# Patient Record
Sex: MALE | Race: WHITE | Employment: FULL TIME | URBAN - NONMETROPOLITAN AREA
[De-identification: names, ages, dates, MRNs, and addresses within clinical notes are randomized per-mention and may not be internally consistent; named-entity substitution may affect disease eponyms.]

---

## 2017-02-20 ENCOUNTER — TELEPHONE (OUTPATIENT)
Dept: FAMILY MEDICINE CLINIC | Age: 57
End: 2017-02-20

## 2017-02-22 RX ORDER — PANTOPRAZOLE SODIUM 40 MG/1
40 TABLET, DELAYED RELEASE ORAL DAILY
Qty: 30 TABLET | Refills: 3 | Status: SHIPPED | OUTPATIENT
Start: 2017-02-22 | End: 2017-03-30 | Stop reason: SDUPTHER

## 2017-03-30 ENCOUNTER — OFFICE VISIT (OUTPATIENT)
Dept: FAMILY MEDICINE CLINIC | Age: 57
End: 2017-03-30
Payer: COMMERCIAL

## 2017-03-30 VITALS
SYSTOLIC BLOOD PRESSURE: 120 MMHG | DIASTOLIC BLOOD PRESSURE: 68 MMHG | BODY MASS INDEX: 25.98 KG/M2 | HEART RATE: 79 BPM | RESPIRATION RATE: 18 BRPM | WEIGHT: 208.9 LBS | HEIGHT: 75 IN | OXYGEN SATURATION: 98 %

## 2017-03-30 DIAGNOSIS — J10.1 INFLUENZA B: Primary | ICD-10-CM

## 2017-03-30 DIAGNOSIS — R09.81 HEAD CONGESTION: ICD-10-CM

## 2017-03-30 DIAGNOSIS — R09.89 CHEST CONGESTION: ICD-10-CM

## 2017-03-30 DIAGNOSIS — R05.9 COUGH: ICD-10-CM

## 2017-03-30 LAB
INFLUENZA A ANTIBODY: NEGATIVE
INFLUENZA B ANTIBODY: POSITIVE

## 2017-03-30 PROCEDURE — 87804 INFLUENZA ASSAY W/OPTIC: CPT | Performed by: NURSE PRACTITIONER

## 2017-03-30 PROCEDURE — 99213 OFFICE O/P EST LOW 20 MIN: CPT | Performed by: NURSE PRACTITIONER

## 2017-03-30 RX ORDER — PANTOPRAZOLE SODIUM 40 MG/1
40 TABLET, DELAYED RELEASE ORAL DAILY
Qty: 30 TABLET | Refills: 3 | Status: SHIPPED | OUTPATIENT
Start: 2017-03-30 | End: 2019-09-24 | Stop reason: ALTCHOICE

## 2017-03-30 RX ORDER — OSELTAMIVIR PHOSPHATE 75 MG/1
75 CAPSULE ORAL 2 TIMES DAILY
Qty: 10 CAPSULE | Refills: 0 | Status: SHIPPED | OUTPATIENT
Start: 2017-03-30 | End: 2017-04-04

## 2017-03-30 RX ORDER — PANTOPRAZOLE SODIUM 40 MG/1
40 TABLET, DELAYED RELEASE ORAL DAILY
Qty: 30 TABLET | Refills: 3 | Status: CANCELLED | OUTPATIENT
Start: 2017-03-30

## 2018-01-09 ENCOUNTER — OFFICE VISIT (OUTPATIENT)
Dept: FAMILY MEDICINE CLINIC | Age: 58
End: 2018-01-09
Payer: COMMERCIAL

## 2018-01-09 VITALS
WEIGHT: 209 LBS | DIASTOLIC BLOOD PRESSURE: 68 MMHG | BODY MASS INDEX: 25.45 KG/M2 | HEART RATE: 70 BPM | HEIGHT: 76 IN | SYSTOLIC BLOOD PRESSURE: 130 MMHG | OXYGEN SATURATION: 98 % | RESPIRATION RATE: 18 BRPM

## 2018-01-09 DIAGNOSIS — M10.071 ACUTE IDIOPATHIC GOUT OF RIGHT ANKLE: Primary | ICD-10-CM

## 2018-01-09 PROCEDURE — 99213 OFFICE O/P EST LOW 20 MIN: CPT | Performed by: NURSE PRACTITIONER

## 2018-01-09 RX ORDER — COLCHICINE 0.6 MG/1
0.6 TABLET ORAL 2 TIMES DAILY
Qty: 60 TABLET | Refills: 0 | Status: SHIPPED | OUTPATIENT
Start: 2018-01-09 | End: 2019-09-24 | Stop reason: ALTCHOICE

## 2018-01-09 RX ORDER — ALLOPURINOL 100 MG/1
100 TABLET ORAL DAILY
Qty: 30 TABLET | Refills: 5 | Status: SHIPPED | OUTPATIENT
Start: 2018-01-09 | End: 2018-01-09 | Stop reason: SDUPTHER

## 2018-01-09 RX ORDER — ALLOPURINOL 100 MG/1
100 TABLET ORAL DAILY
Qty: 30 TABLET | Refills: 5 | Status: SHIPPED | OUTPATIENT
Start: 2018-01-09 | End: 2019-09-24 | Stop reason: ALTCHOICE

## 2018-01-09 RX ORDER — COLCHICINE 0.6 MG/1
0.6 TABLET ORAL 2 TIMES DAILY
Qty: 60 TABLET | Refills: 0 | Status: SHIPPED | OUTPATIENT
Start: 2018-01-09 | End: 2018-01-09 | Stop reason: SDUPTHER

## 2018-01-09 NOTE — PATIENT INSTRUCTIONS
they seem to have the same symptoms. What may be good for you may be harmful to others. · If you are no longer taking a prescribed medication and you have pills left please take your pills out of their original containers. Mix crushed pills with an undesirable substance, such as cat litter or used coffee grounds. Put the mixture into a disposable container with a lid, such as an empty margarine tub, or into a sealable bag. Cover up or remove any of your personal information on the empty containers by covering it with black permanent marker or duct tape. Place the sealed container with the mixture, and the empty drug containers, in the trash. · If you use a medication that is in the form of a patch, dispose of used patches by folding them in half so that the sticky sides meet, and then flushing them down a toilet. They should not be placed in the household trash where children or pets can find them. · If you have any questions, ask your provider or pharmacist for more information. · Be sure to keep all appointments for provider visits or tests.   .smokeless

## 2018-01-11 ENCOUNTER — TELEPHONE (OUTPATIENT)
Dept: FAMILY MEDICINE CLINIC | Age: 58
End: 2018-01-11

## 2018-01-12 RX ORDER — PROBENECID AND COLCHICINE 500; .5 MG/1; MG/1
1 TABLET ORAL 2 TIMES DAILY
Qty: 30 TABLET | Refills: 0 | Status: SHIPPED | OUTPATIENT
Start: 2018-01-12 | End: 2019-09-24 | Stop reason: ALTCHOICE

## 2018-01-15 ENCOUNTER — TELEPHONE (OUTPATIENT)
Dept: FAMILY MEDICINE CLINIC | Age: 58
End: 2018-01-15

## 2018-01-20 ASSESSMENT — ENCOUNTER SYMPTOMS: RESPIRATORY NEGATIVE: 1

## 2018-01-20 NOTE — PROGRESS NOTES
SUBJECTIVE:  Darlyn Parr is a 59 y.o. male that presents with   Chief Complaint   Patient presents with    Ankle Pain     right, started yesterday   . HPI:    Patient presents with complaints of 3 day history of right ankle pain with swelling and patient says he has had to work on it and nothing is helping. He thought he could maybe get a shot and be able to keep working. No known injury but known history of gout. Review of Systems   Constitutional: Positive for activity change. HENT: Negative. Respiratory: Negative. Cardiovascular: Negative. Genitourinary: Negative. Musculoskeletal: Positive for arthralgias and gait problem. All other systems reviewed and are negative. OBJECTIVE:  /68 (Site: Left Arm, Position: Sitting, Cuff Size: Large Adult)   Pulse 70   Resp 18   Ht 6' 4\" (1.93 m)   Wt 209 lb (94.8 kg)   SpO2 98% Comment: room air  BMI 25.44 kg/m²    Physical Exam   Constitutional: He appears well-developed and well-nourished. HENT:   Head: Normocephalic and atraumatic. Eyes: Conjunctivae are normal. Pupils are equal, round, and reactive to light. Cardiovascular: Normal rate, regular rhythm and normal heart sounds. Pulmonary/Chest: Effort normal and breath sounds normal.   Musculoskeletal:        Right ankle: He exhibits decreased range of motion. Tenderness. Lateral malleolus tenderness found. Nursing note and vitals reviewed. No results found for requested labs within last 30 days. No results found for: LABA1C, LABMICR, LDLCALC      No results found for: WBC, NEUTROABS, HGB, HCT, MCV, PLT    No results found for: TSH      ASSESSMENT/PLAN:  1. Acute idiopathic gout of right ankle          No orders of the defined types were placed in this encounter.        Outpatient Encounter Prescriptions as of 1/9/2018   Medication Sig Dispense Refill    colchicine (COLCRYS) 0.6 MG tablet Take 1 tablet by mouth 2 times daily 60 tablet 0    allopurinol (ZYLOPRIM) 100 MG tablet Take 1 tablet by mouth daily 30 tablet 5    [DISCONTINUED] colchicine (COLCRYS) 0.6 MG tablet Take 1 tablet by mouth 2 times daily 60 tablet 0    [DISCONTINUED] allopurinol (ZYLOPRIM) 100 MG tablet Take 1 tablet by mouth daily 30 tablet 5    pantoprazole (PROTONIX) 40 MG tablet Take 1 tablet by mouth daily 30 tablet 3     No facility-administered encounter medications on file as of 1/9/2018. Return if symptoms worsen or fail to improve. PATIENT COUNSELING    Counseling was provided today regarding the following topics: Healthy eating habits, Regular exercise, substance abuse and healthy sleep habits. Discussed use, benefit, and side effects of prescribed medications. Barriers to medication compliance addressed. All patient questions answered. Pt voiced understanding.

## 2018-10-08 ENCOUNTER — HOSPITAL ENCOUNTER (OUTPATIENT)
Facility: HOSPITAL | Age: 58
Discharge: HOME | End: 2018-10-08
Payer: COMMERCIAL

## 2018-10-08 ENCOUNTER — OFFICE VISIT (OUTPATIENT)
Dept: FAMILY MEDICINE CLINIC | Age: 58
End: 2018-10-08
Payer: COMMERCIAL

## 2018-10-08 VITALS
SYSTOLIC BLOOD PRESSURE: 117 MMHG | DIASTOLIC BLOOD PRESSURE: 62 MMHG | BODY MASS INDEX: 23.87 KG/M2 | RESPIRATION RATE: 20 BRPM | OXYGEN SATURATION: 99 % | HEIGHT: 76 IN | HEART RATE: 68 BPM | WEIGHT: 196 LBS

## 2018-10-08 DIAGNOSIS — Z20.09 EXPOSURE TO INTESTINAL INFECTIOUS DISEASE: ICD-10-CM

## 2018-10-08 DIAGNOSIS — R19.7 DIARRHEA, UNSPECIFIED TYPE: Primary | ICD-10-CM

## 2018-10-08 DIAGNOSIS — R51.9 NONINTRACTABLE HEADACHE, UNSPECIFIED CHRONICITY PATTERN, UNSPECIFIED HEADACHE TYPE: ICD-10-CM

## 2018-10-08 DIAGNOSIS — R19.7 DIARRHEA, UNSPECIFIED TYPE: ICD-10-CM

## 2018-10-08 PROCEDURE — 80053 COMPREHEN METABOLIC PANEL: CPT

## 2018-10-08 PROCEDURE — 99213 OFFICE O/P EST LOW 20 MIN: CPT | Performed by: FAMILY MEDICINE

## 2018-10-08 PROCEDURE — 85027 COMPLETE CBC AUTOMATED: CPT

## 2018-10-08 PROCEDURE — 36415 COLL VENOUS BLD VENIPUNCTURE: CPT

## 2018-10-08 PROCEDURE — 86708 HEPATITIS A ANTIBODY: CPT

## 2018-10-08 RX ORDER — AZITHROMYCIN 250 MG/1
TABLET, FILM COATED ORAL
Qty: 6 TABLET | Refills: 0 | Status: SHIPPED | OUTPATIENT
Start: 2018-10-08 | End: 2019-09-24 | Stop reason: ALTCHOICE

## 2018-10-08 ASSESSMENT — ENCOUNTER SYMPTOMS: DIARRHEA: 1

## 2018-10-08 ASSESSMENT — PATIENT HEALTH QUESTIONNAIRE - PHQ9
1. LITTLE INTEREST OR PLEASURE IN DOING THINGS: 0
SUM OF ALL RESPONSES TO PHQ9 QUESTIONS 1 & 2: 0
2. FEELING DOWN, DEPRESSED OR HOPELESS: 0
SUM OF ALL RESPONSES TO PHQ QUESTIONS 1-9: 0
SUM OF ALL RESPONSES TO PHQ QUESTIONS 1-9: 0

## 2018-10-09 LAB
A/G RATIO: 1.8 (ref 0.8–2)
ALBUMIN SERPL-MCNC: 4.6 G/DL (ref 3.4–4.8)
ALP BLD-CCNC: 87 U/L (ref 25–100)
ALT SERPL-CCNC: 14 U/L (ref 4–36)
ANION GAP SERPL CALCULATED.3IONS-SCNC: 9 MMOL/L (ref 3–16)
AST SERPL-CCNC: 15 U/L (ref 8–33)
BILIRUB SERPL-MCNC: 1.5 MG/DL (ref 0.3–1.2)
BUN BLDV-MCNC: 8 MG/DL (ref 6–20)
CALCIUM SERPL-MCNC: 9.4 MG/DL (ref 8.5–10.5)
CHLORIDE BLD-SCNC: 105 MMOL/L (ref 98–107)
CO2: 29 MMOL/L (ref 20–30)
CREAT SERPL-MCNC: 1.1 MG/DL (ref 0.4–1.2)
GFR AFRICAN AMERICAN: >59
GFR NON-AFRICAN AMERICAN: >59
GLOBULIN: 2.6 G/DL
GLUCOSE BLD-MCNC: 89 MG/DL (ref 74–106)
HCT VFR BLD CALC: 44.9 % (ref 40–54)
HEMOGLOBIN: 15.6 G/DL (ref 13–18)
MCH RBC QN AUTO: 31.6 PG (ref 27–32)
MCHC RBC AUTO-ENTMCNC: 34.7 G/DL (ref 31–35)
MCV RBC AUTO: 91.1 FL (ref 80–100)
PDW BLD-RTO: 12.6 % (ref 11–16)
PLATELET # BLD: 227 K/UL (ref 150–400)
PMV BLD AUTO: 10.1 FL (ref 6–10)
POTASSIUM SERPL-SCNC: 4.1 MMOL/L (ref 3.4–5.1)
RBC # BLD: 4.93 M/UL (ref 4.5–6)
SODIUM BLD-SCNC: 143 MMOL/L (ref 136–145)
TOTAL PROTEIN: 7.2 G/DL (ref 6.4–8.3)
WBC # BLD: 6.4 K/UL (ref 4–11)

## 2018-10-11 LAB — HAV AB SERPL IA-ACNC: POSITIVE

## 2018-10-15 ENCOUNTER — TELEPHONE (OUTPATIENT)
Dept: FAMILY MEDICINE CLINIC | Age: 58
End: 2018-10-15

## 2018-10-15 NOTE — TELEPHONE ENCOUNTER
Patient called and asked to talk to Dr. Charmayne Sack. He stated that he was to call back in regards to his lab results. He is expecting someone to call him back today.  Maribell-     Pam 10/15/18 [facility]

## 2018-11-20 ENCOUNTER — TELEPHONE (OUTPATIENT)
Dept: FAMILY MEDICINE CLINIC | Age: 58
End: 2018-11-20

## 2019-09-24 ENCOUNTER — OFFICE VISIT (OUTPATIENT)
Dept: FAMILY MEDICINE CLINIC | Age: 59
End: 2019-09-24
Payer: COMMERCIAL

## 2019-09-24 VITALS
HEART RATE: 64 BPM | DIASTOLIC BLOOD PRESSURE: 64 MMHG | WEIGHT: 206 LBS | OXYGEN SATURATION: 98 % | HEIGHT: 76 IN | BODY MASS INDEX: 25.09 KG/M2 | SYSTOLIC BLOOD PRESSURE: 104 MMHG
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
## 2019-09-24 DIAGNOSIS — S61.259A DOG BITE OF FINGER, INITIAL ENCOUNTER: Primary | ICD-10-CM

## 2019-09-24 PROCEDURE — 99214 OFFICE O/P EST MOD 30 MIN: CPT | Performed by: NURSE PRACTITIONER

## 2019-09-24 RX ORDER — CEPHALEXIN 500 MG/1
500 CAPSULE ORAL 2 TIMES DAILY
Qty: 20 CAPSULE | Refills: 0 | Status: SHIPPED | OUTPATIENT
Start: 2019-09-24 | End: 2020-12-15 | Stop reason: SDUPTHER

## 2019-09-24 ASSESSMENT — PATIENT HEALTH QUESTIONNAIRE - PHQ9
1. LITTLE INTEREST OR PLEASURE IN DOING THINGS: 0
2. FEELING DOWN, DEPRESSED OR HOPELESS: 0
SUM OF ALL RESPONSES TO PHQ QUESTIONS 1-9: 0
SUM OF ALL RESPONSES TO PHQ9 QUESTIONS 1 & 2: 0
SUM OF ALL RESPONSES TO PHQ QUESTIONS 1-9: 0

## 2019-09-24 ASSESSMENT — ENCOUNTER SYMPTOMS
NAUSEA: 0
EYE PAIN: 0
SORE THROAT: 0
VOMITING: 0
RHINORRHEA: 0
ABDOMINAL PAIN: 0
DIARRHEA: 0
EYE ITCHING: 0
SHORTNESS OF BREATH: 0

## 2019-09-24 NOTE — PROGRESS NOTES
two-point discrimination, normal capillary refill, no deformity, no laceration and no swelling. Decreased sensation is not present in the ulnar distribution, is not present in the medial distribution and is not present in the radial distribution. He exhibits no finger abduction, no thumb/finger opposition and no wrist extension trouble. Hands:  Neurological: He is alert and oriented to person, place, and time. Skin: Skin is warm and dry. Psychiatric: He has a normal mood and affect. His speech is normal and behavior is normal. Judgment and thought content normal. Cognition and memory are normal.   Nursing note and vitals reviewed. Assessment/Plan    1. Dog bite of finger, initial encounter  Area of the finger was cleansed with betadine and DSD applied - advised patient to keep area clean and covered when working and take all his antibiotics. - cephALEXin (KEFLEX) 500 MG capsule; Take 1 capsule by mouth 2 times daily for 10 days  Dispense: 20 capsule; Refill: 0       Return in about 1 week (around 10/1/2019), or if symptoms worsen or fail to improve, for wound check .     Jeanne MANDUJANO  09/24/19   3:30 PM

## 2019-09-24 NOTE — PATIENT INSTRUCTIONS
· Keep a list of your medicines with you. List all of the prescription medicines, nonprescription medicines, supplements, natural remedies, and vitamins that you take. Tell your healthcare providers who treat you about all of the products you are taking. Your provider can provide you with a form to keep track of them. Just ask. · Follow the directions that come with your medicine, including information about food or alcohol. Make sure you know how and when to take your medicine. Do not take more or less than you are supposed to take. · Keep all medicines out of the reach of children. · Store medicines according to the directions on the label. · Monitor yourself. Learn to know how your body reacts to your new medicine and keep track of how it makes you feel before attempting (If your provider has allowed you to do so) to drive or go to work. · Seek emergency medical attention if you think you have used too much of this medicine. An overdose of any prescription medicine can be fatal. Overdose symptoms may include extreme drowsiness, muscle weakness, confusion, cold and clammy skin, pinpoint pupils, shallow breathing, slow heart rate, fainting, or coma. · Don't share prescription medicines with others, even when they seem to have the same symptoms. What may be good for you may be harmful to others. · If you are no longer taking a prescribed medication and you have pills left please take your pills out of their original containers. Mix crushed pills with an undesirable substance, such as cat litter or used coffee grounds. Put the mixture into a disposable container with a lid, such as an empty margarine tub, or into a sealable bag. Cover up or remove any of your personal information on the empty containers by covering it with black permanent marker or duct tape. Place the sealed container with the mixture, and the empty drug containers, in the trash.    · If you use a medication that is in the form of a patch, to heal, depending on how bad it is. Taking good care of your wound at home will help it heal and reduce your chance of infection. The doctor has checked you carefully, but problems can develop later. If you notice any problems or new symptoms, get medical treatment right away. Follow-up care is a key part of your treatment and safety. Be sure to make and go to all appointments, and call your doctor if you are having problems. It's also a good idea to know your test results and keep a list of the medicines you take. How can you care for yourself at home? If your doctor told you how to care for your wound, follow your doctor's instructions. If you did not get instructions, follow this general advice:  After 24 to 48 hours, gently wash the wound with clean water 2 times a day. Do not scrub or soak the wound. Don't use hydrogen peroxide or alcohol, which can slow healing. You may cover the wound with a thin layer of petroleum jelly, such as Vaseline, and a nonstick bandage. Apply more petroleum jelly and replace the bandage as needed. After you shower, gently dry the wound with a clean towel. If your doctor has closed the wound, cover the bandage with a plastic bag before you take a shower. A small amount of skin redness and swelling around the wound edges and the stitches or staples is normal. Your wound may itch or feel irritated. Do not scratch or rub the wound. Ask your doctor if you can take an over-the-counter pain medicine, such as acetaminophen (Tylenol), ibuprofen (Advil, Motrin), or naproxen (Aleve). Read and follow all instructions on the label. Do not take two or more pain medicines at the same time unless the doctor told you to. Many pain medicines have acetaminophen, which is Tylenol. Too much acetaminophen (Tylenol) can be harmful. If your bite puts you at risk for rabies, you will get a series of shots over the next few weeks to prevent rabies.  Your doctor will tell you when to get the

## 2019-10-01 ENCOUNTER — OFFICE VISIT (OUTPATIENT)
Dept: FAMILY MEDICINE CLINIC | Age: 59
End: 2019-10-01
Payer: COMMERCIAL

## 2019-10-01 VITALS
WEIGHT: 205 LBS | OXYGEN SATURATION: 98 % | DIASTOLIC BLOOD PRESSURE: 68 MMHG | SYSTOLIC BLOOD PRESSURE: 112 MMHG | HEIGHT: 76 IN | RESPIRATION RATE: 18 BRPM | HEART RATE: 72 BPM | BODY MASS INDEX: 24.96 KG/M2

## 2019-10-01 DIAGNOSIS — S61.258D: Primary | ICD-10-CM

## 2019-10-01 PROCEDURE — 99213 OFFICE O/P EST LOW 20 MIN: CPT | Performed by: NURSE PRACTITIONER

## 2019-10-06 ASSESSMENT — ENCOUNTER SYMPTOMS
BACK PAIN: 0
SORE THROAT: 0
EYE PAIN: 0
VOMITING: 0
EYE REDNESS: 0
CHEST TIGHTNESS: 0
ABDOMINAL PAIN: 0
TROUBLE SWALLOWING: 0
COUGH: 0
SHORTNESS OF BREATH: 0
DIARRHEA: 0
COLOR CHANGE: 0
NAUSEA: 0

## 2020-12-15 ENCOUNTER — VIRTUAL VISIT (OUTPATIENT)
Dept: FAMILY MEDICINE CLINIC | Age: 60
End: 2020-12-15
Payer: COMMERCIAL

## 2020-12-15 PROCEDURE — 99213 OFFICE O/P EST LOW 20 MIN: CPT | Performed by: NURSE PRACTITIONER

## 2020-12-15 RX ORDER — CEPHALEXIN 500 MG/1
500 CAPSULE ORAL 2 TIMES DAILY
Qty: 20 CAPSULE | Refills: 0 | Status: SHIPPED | OUTPATIENT
Start: 2020-12-15 | End: 2020-12-25

## 2020-12-15 RX ORDER — ALBUTEROL SULFATE 90 UG/1
2 AEROSOL, METERED RESPIRATORY (INHALATION) EVERY 6 HOURS PRN
Qty: 1 INHALER | Refills: 3 | Status: SHIPPED | OUTPATIENT
Start: 2020-12-15

## 2020-12-15 RX ORDER — PREDNISONE 10 MG/1
10 TABLET ORAL
Qty: 15 TABLET | Refills: 0 | Status: SHIPPED | OUTPATIENT
Start: 2020-12-15 | End: 2020-12-20

## 2020-12-15 ASSESSMENT — ENCOUNTER SYMPTOMS
COUGH: 1
GASTROINTESTINAL NEGATIVE: 1
EYES NEGATIVE: 1

## 2020-12-15 NOTE — PROGRESS NOTES
SUBJECTIVE:  Paulie Ibarra is a 56 y.o. male that presents with   Chief Complaint   Patient presents with    Cough    Congestion   . HPI:    This is a very pleasant 68-year-old white male who presents today via video with complaints of cough and congestion he says that it started Sunday night he went hunting on Saturday and on Sunday evening he started coughing and rattling in his chest.  He tells me that he did use his wife's inhaler and that that helped him to start coughing some of the stuff. Says that it is yellow. He has no fever or chills does have some nasal congestion. Tells me that when he does cough some of that up that it really hurts. He wondered if he might have pleurisy. Breathing is not painful. Review of Systems   Constitutional: Negative. HENT: Positive for congestion and postnasal drip. Eyes: Negative. Respiratory: Positive for cough. Cardiovascular: Negative. Gastrointestinal: Negative. Endocrine: Negative. Genitourinary: Negative. Musculoskeletal: Negative. Neurological: Negative. Psychiatric/Behavioral: Negative. OBJECTIVE:  There were no vitals taken for this visit. Physical Exam  Constitutional:       Appearance: Normal appearance. HENT:      Head: Normocephalic and atraumatic. Pulmonary:      Effort: Pulmonary effort is normal.   Neurological:      Mental Status: He is alert and oriented to person, place, and time. Psychiatric:         Mood and Affect: Mood normal.         Behavior: Behavior normal.         Thought Content: Thought content normal.         Judgment: Judgment normal.       No results found for requested labs within last 30 days.        No results found for: Angelica Brown, LDLCALC      Lab Results   Component Value Date    WBC 6.4 10/08/2018    HGB 15.6 10/08/2018    HCT 44.9 10/08/2018    MCV 91.1 10/08/2018     10/08/2018       No results found for: TSH      ASSESSMENT/PLAN:   Diagnosis Orders 1. Acute bronchitis, unspecified organism           No orders of the defined types were placed in this encounter. Outpatient Encounter Medications as of 12/15/2020   Medication Sig Dispense Refill    cephALEXin (KEFLEX) 500 MG capsule Take 1 capsule by mouth 2 times daily for 10 days 20 capsule 0    predniSONE (DELTASONE) 10 MG tablet Take 1 tablet by mouth 3 times daily (with meals) for 5 days 15 tablet 0    albuterol sulfate  (90 Base) MCG/ACT inhaler Inhale 2 puffs into the lungs every 6 hours as needed for Wheezing or Shortness of Breath (cough) 1 Inhaler 3     No facility-administered encounter medications on file as of 12/15/2020. Prince Andres is a 56 y.o. male being evaluated by a Virtual Visit (video visit) encounter to address concerns as mentioned above. A caregiver was present when appropriate. Due to this being a TeleHealth encounter (During PYIAE-94 public health emergency), evaluation of the following organ systems was limited: Vitals/Constitutional/EENT/Resp/CV/GI//MS/Neuro/Skin/Heme-Lymph-Imm. Pursuant to the emergency declaration under the [address] [place] [address], [address] [place] [address] authority and the [employer] and [employer] Act, this Virtual Visit was conducted with patient's (and/or legal guardian's) consent, to reduce the patient's risk of exposure to COVID-19 and provide necessary medical care. The patient (and/or legal guardian) has also been advised to contact this office for worsening conditions or problems, and seek emergency medical treatment and/or call 911 if deemed necessary. Patient identification was verified at the start of the visit: Yes    Total time spent for this encounter: Not billed by time    Services were provided through a video synchronous discussion virtually to substitute for in-person clinic visit. Patient and provider were located at their individual homes. --DELBERT Young on 12/15/2020 at 1:51 PM    An electronic signature was used to authenticate this note. Return if symptoms worsen or fail to improve. PATIENT COUNSELING    Counseling was provided today regardingthe following topics: Healthy eating habits, Regular exercise, substance abuse and healthy sleep habits. Discussed use, benefit, and side effectsof prescribed medications. Barriers to medication compliance addressed. All patient questions answered. Pt voiced understanding.

## 2021-04-20 ENCOUNTER — NURSE TRIAGE (OUTPATIENT)
Dept: OTHER | Facility: CLINIC | Age: 61
End: 2021-04-20

## 2021-04-20 ENCOUNTER — VIRTUAL VISIT (OUTPATIENT)
Dept: FAMILY MEDICINE CLINIC | Age: 61
End: 2021-04-20
Payer: COMMERCIAL

## 2021-04-20 DIAGNOSIS — M54.42 ACUTE MIDLINE LOW BACK PAIN WITH LEFT-SIDED SCIATICA: Primary | ICD-10-CM

## 2021-04-20 PROCEDURE — 99212 OFFICE O/P EST SF 10 MIN: CPT | Performed by: NURSE PRACTITIONER

## 2021-04-20 RX ORDER — IBUPROFEN 800 MG/1
800 TABLET ORAL 2 TIMES DAILY PRN
Qty: 180 TABLET | Refills: 1 | Status: SHIPPED | OUTPATIENT
Start: 2021-04-20 | End: 2021-06-19

## 2021-04-20 RX ORDER — PREDNISONE 20 MG/1
20 TABLET ORAL 2 TIMES DAILY
Qty: 10 TABLET | Refills: 0 | Status: SHIPPED | OUTPATIENT
Start: 2021-04-20 | End: 2021-04-25

## 2021-04-20 RX ORDER — CYCLOBENZAPRINE HCL 5 MG
TABLET ORAL
Qty: 30 TABLET | Refills: 0 | Status: SHIPPED | OUTPATIENT
Start: 2021-04-20

## 2021-04-20 NOTE — TELEPHONE ENCOUNTER
Reason for Disposition   Back pain from overuse (work, exercise, gardening) OR from twisting, lifting, or bending injury   SEVERE back pain (e.g., excruciating, unable to do any normal activities) and not improved after pain medicine and CARE ADVICE    Answer Assessment - Initial Assessment Questions  1. MECHANISM: \"How did the injury happen? \" (Consider the possibility of domestic violence or elder abuse)     Working on audrey 1.5 weeks ago, yesterday while mowing grass    2. ONSET: \"When did the injury happen? \" (Minutes or hours ago)      Yesterday evening    3. LOCATION: \"What part of the back is injured? \"      Lower back radiating to left leg    4. SEVERITY: \"Can you move the back normally? \"      Can move normally, just hurts much more when bending down/twisting    5. PAIN: \"Is there any pain? \" If so, ask: \"How bad is the pain? \"   (Scale 1-10; or mild, moderate, severe)     10    6. CORD SYMPTOMS: Any weakness or numbness of the arms or legs? \"      denies    7. SIZE: For cuts, bruises, or swelling, ask: \"How large is it? \" (e.g., inches or centimeters)      N/a    8. TETANUS: For any breaks in the skin, ask: \"When was the last tetanus booster? \"      N/a    9. OTHER SYMPTOMS: \"Do you have any other symptoms? \" (e.g., abdominal pain, blood in urine)      denies    10. PREGNANCY: \"Is there any chance you are pregnant? \" \"When was your last menstrual period? \"        n/a    Protocols used: BACK INJURY-ADULT-[place], BACK PAIN-ADULT-[place]    Received call from Ammy Hoff at pre-service center [facility] with [employer] Complaint. Brief description of triage: Pt called with concern of severe lower back pain, radiating to left leg starting yesterday after cutting grass. Similar episode happened about 10 days ago, which resolved after a few days rest.     Triage indicates for patient to PCP today.     Care advice provided, patient verbalizes understanding; denies any other questions or concerns; instructed to call back for any new or worsening symptoms. Writer provided warm transfer to [facility] for appointment scheduling. Attention Provider: Thank you for allowing me to participate in the care of your patient. The patient was connected to triage in response to information provided to the [facility]. Please do not respond through this encounter as the response is not directed to a shared pool.

## 2021-04-20 NOTE — PROGRESS NOTES
SUBJECTIVE:    Patient ID: Lucina uRiz is a 56 y.o.male. Chief Complaint   Patient presents with    Back Pain         HPI:    Lucina Ruiz is a 56 y.o. male evaluated via telephone on 4/20/2021. Consent:  He and/or health care decision maker is aware that that he may receive a bill for this telephone service, depending on his insurance coverage, and has provided verbal consent to proceed: Yes  Documentation:  I communicated with the patient and/or health care decision maker about LBP. Details of this discussion including any medical advice provided: to patient  I affirm this is a Patient Initiated Episode with a Patient who has not had a related appointment within my department in the past 7 days or scheduled within the next 24 hours. Patient identification was verified at the start of the visit: Yes  Total Time: minutes: 5-10 minutes  The visit was conducted pursuant to the emergency declaration under the [address] [place] [address], [address] [place] [address] authority and the [employer] and [employer] Act. Patient identification was verified, and a caregiver was present when appropriate. The patient was located in a state where the provider was credentialed to provide care. Note: not billable if this call serves to triage the patient into an appointment for the relevant concern    Maximilian Shaw       LBP  Pt c/o LBP that started few days ago. Laying fooring and twisted to get materials and \"that's when my back went out on me\". Reports pain dull, aching low middle back and radiates down to left leg. Sharp shooting pain down left leg. Worse with twisting, bending. OTC meds not helping. Has had this happen before but on right side. Denies bowel/bladder issues, CP, SOB, extremity weakness, loss of sensation, discoloration.        Patient's medications, allergies, past medical, surgical, social and family histories were reviewed and updated as HGB 15.6 10/08/2018    HCT 44.9 10/08/2018    MCV 91.1 10/08/2018     10/08/2018       No results found for: TSH      ASSESSMENT/PLAN:     1. Acute midline low back pain with left-sided sciatica  Take medications as prescribed. Continue home medications. Discussed symptom management. Return to clinic S&S worsen or no improvement noted. Patient verbalized understanding.     - predniSONE (DELTASONE) 20 MG tablet; Take 1 tablet by mouth 2 times daily for 5 days  Dispense: 10 tablet; Refill: 0  - ibuprofen (ADVIL;MOTRIN) 800 MG tablet; Take 1 tablet by mouth 2 times daily as needed for Pain Take with food and full glass water  Dispense: 180 tablet; Refill: 1  - cyclobenzaprine (FLEXERIL) 5 MG tablet; Take 1 tab in morning as needed then 1-2 tabs nightly as needed. For muscle spasms, sciatica. Dispense: 30 tablet; Refill: 0        Orders Placed This Encounter   Medications    predniSONE (DELTASONE) 20 MG tablet     Sig: Take 1 tablet by mouth 2 times daily for 5 days     Dispense:  10 tablet     Refill:  0    ibuprofen (ADVIL;MOTRIN) 800 MG tablet     Sig: Take 1 tablet by mouth 2 times daily as needed for Pain Take with food and full glass water     Dispense:  180 tablet     Refill:  1    cyclobenzaprine (FLEXERIL) 5 MG tablet     Sig: Take 1 tab in morning as needed then 1-2 tabs nightly as needed. For muscle spasms, sciatica.      Dispense:  30 tablet     Refill:  0

## 2021-05-13 ASSESSMENT — ENCOUNTER SYMPTOMS
EYES NEGATIVE: 1
NAUSEA: 0
BACK PAIN: 1
RESPIRATORY NEGATIVE: 1
DIARRHEA: 0
VOMITING: 0

## 2024-10-17 RX ORDER — CLOTRIMAZOLE AND BETAMETHASONE DIPROPIONATE 10; .64 MG/G; MG/G
CREAM TOPICAL
Qty: 80 G | Refills: 1 | Status: SHIPPED | OUTPATIENT
Start: 2024-10-17